# Patient Record
Sex: MALE | Race: WHITE | NOT HISPANIC OR LATINO | Employment: FULL TIME | URBAN - METROPOLITAN AREA
[De-identification: names, ages, dates, MRNs, and addresses within clinical notes are randomized per-mention and may not be internally consistent; named-entity substitution may affect disease eponyms.]

---

## 2019-08-13 ENCOUNTER — OFFICE VISIT (OUTPATIENT)
Dept: URGENT CARE | Facility: CLINIC | Age: 24
End: 2019-08-13
Payer: COMMERCIAL

## 2019-08-13 VITALS
BODY MASS INDEX: 29.16 KG/M2 | RESPIRATION RATE: 18 BRPM | HEART RATE: 88 BPM | WEIGHT: 220 LBS | TEMPERATURE: 96.8 F | DIASTOLIC BLOOD PRESSURE: 78 MMHG | HEIGHT: 73 IN | SYSTOLIC BLOOD PRESSURE: 120 MMHG | OXYGEN SATURATION: 99 %

## 2019-08-13 DIAGNOSIS — B00.1 HERPES LABIALIS WITHOUT COMPLICATION: Primary | ICD-10-CM

## 2019-08-13 PROCEDURE — G0381 LEV 2 HOSP TYPE B ED VISIT: HCPCS | Performed by: PHYSICIAN ASSISTANT

## 2019-08-13 PROCEDURE — S9083 URGENT CARE CENTER GLOBAL: HCPCS | Performed by: PHYSICIAN ASSISTANT

## 2019-08-13 RX ORDER — VALACYCLOVIR HYDROCHLORIDE 1 G/1
TABLET, FILM COATED ORAL
Qty: 4 TABLET | Refills: 0 | Status: SHIPPED | OUTPATIENT
Start: 2019-08-13 | End: 2019-08-14

## 2019-08-13 NOTE — PROGRESS NOTES
[facility]    NAME: Loy Mcrae is a 21 y o  male  : 1995    MRN: [id number]  DATE: 2019  TIME: 1:01 PM    Assessment and Plan   Herpes labialis without complication [H83 3]  1  Herpes labialis without complication  valACYclovir (VALTREX) 1,000 mg tablet       Patient Instructions     Patient Instructions   Take medication as instructed  Transmission precautions advised  Follow up with family doctor as needed  Chief Complaint     Chief Complaint   Patient presents with    Mouth Lesions     Has a cold sore to the right upper lip       History of Present Illness   Loy Mcrae presents to the clinic c/o  55-year-old male comes in with cold sore upper lip  He reports a history of recurrent cold sores  He has tried Abreva, vitamin pills, UV light treatment without a whole lot of affect  He has been given pills in the past and says that they work the best       Review of Systems   Review of Systems   Constitutional: Negative  Skin: Positive for color change and rash  Current Medications     Long-Term Medications   Medication Sig Dispense Refill    valACYclovir (VALTREX) 1,000 mg tablet 2 tablets every 12 hours x 1 day  4 tablet 0       Current Allergies     Allergies as of 2019    (No Known Allergies)          The following portions of the patient's history were reviewed and updated as appropriate: allergies, current medications, past family history, past medical history, past social history, past surgical history and problem list   History reviewed  No pertinent past medical history  History reviewed  No pertinent surgical history  History reviewed  No pertinent family history  Objective   /78   Pulse 88   Temp (!) 96 8 °F (36 °C) (Tympanic)   Resp 18   Ht 6' 1" (1 854 m)   Wt 99 8 kg (220 lb)   SpO2 99%   BMI 29 03 kg/m²   No LMP for male patient         Physical Exam     Physical Exam   Constitutional: He is oriented to person, place, and time  He appears well-developed and well-nourished  No distress  HENT:   Single herpetic lesion upper lip on right side without signs of secondary infection  Vesicular  Approx  2 5 mm  Cardiovascular: Normal rate and regular rhythm  Pulmonary/Chest: Effort normal    Neurological: He is alert and oriented to person, place, and time  Skin: Skin is warm and dry  He is not diaphoretic  Psychiatric: He has a normal mood and affect  Nursing note and vitals reviewed

## 2019-08-13 NOTE — PATIENT INSTRUCTIONS
Take medication as instructed  Transmission precautions advised  Follow up with family doctor as needed

## 2021-01-18 ENCOUNTER — OFFICE VISIT (OUTPATIENT)
Dept: URGENT CARE | Facility: CLINIC | Age: 26
End: 2021-01-18
Payer: COMMERCIAL

## 2021-01-18 VITALS
RESPIRATION RATE: 18 BRPM | HEART RATE: 88 BPM | OXYGEN SATURATION: 98 % | HEIGHT: 73 IN | SYSTOLIC BLOOD PRESSURE: 147 MMHG | BODY MASS INDEX: 30.48 KG/M2 | TEMPERATURE: 98.7 F | DIASTOLIC BLOOD PRESSURE: 70 MMHG | WEIGHT: 230 LBS

## 2021-01-18 DIAGNOSIS — S61.432A PUNCTURE WOUND OF LEFT HAND WITHOUT FOREIGN BODY, INITIAL ENCOUNTER: Primary | ICD-10-CM

## 2021-01-18 PROCEDURE — 99212 OFFICE O/P EST SF 10 MIN: CPT | Performed by: PHYSICIAN ASSISTANT

## 2021-01-18 PROCEDURE — 90471 IMMUNIZATION ADMIN: CPT | Performed by: PHYSICIAN ASSISTANT

## 2021-01-18 PROCEDURE — 90715 TDAP VACCINE 7 YRS/> IM: CPT

## 2021-01-18 RX ORDER — CEPHALEXIN 500 MG/1
500 CAPSULE ORAL EVERY 8 HOURS SCHEDULED
Qty: 21 CAPSULE | Refills: 0 | Status: SHIPPED | OUTPATIENT
Start: 2021-01-18 | End: 2021-01-25

## 2021-01-18 NOTE — PATIENT INSTRUCTIONS
Puncture Wound   AMBULATORY CARE:   A puncture wound  is a hole in the skin made by a sharp, pointed object  The area may be bruised or swollen  You may have bleeding, pain, or trouble moving the affected area  Seek care immediately if:   · You have severe pain  · You have numbness or tingling in the area of your wound  · Your wound starts bleeding and does not stop, even after you apply pressure  Call your doctor if:   · You have new drainage or a bad odor coming from the wound  · You have a fever or chills  · You have increased swelling, redness, or pain  · You have red streaks on your skin coming from your wound  · You have questions or concerns about your condition or care  Medicines: You may need any of the following:  · NSAIDs , such as ibuprofen, help decrease swelling, pain, and fever  This medicine is available with or without a doctor's order  NSAIDs can cause stomach bleeding or kidney problems in certain people  If you take blood thinner medicine, always ask your healthcare provider if NSAIDs are safe for you  Always read the medicine label and follow directions  · Antibiotics  help prevent a bacterial infection  · Take your medicine as directed  Contact your healthcare provider if you think your medicine is not helping or if you have side effects  Tell him of her if you are allergic to any medicine  Keep a list of the medicines, vitamins, and herbs you take  Include the amounts, and when and why you take them  Bring the list or the pill bottles to follow-up visits  Carry your medicine list with you in case of an emergency  Care for your wound as directed:  Keep your wound clean and dry  When you are allowed to bathe, carefully wash the wound with soap and water  Dry the area and put on new, clean bandages as directed  Change your bandages when they get wet or dirty  Rest and elevate  the injured area above the level of your heart as often as you can   This will help decrease swelling and pain  Prop your injured area on pillows or blankets to keep it elevated comfortably  Follow up with your doctor in 2 to 3 days:  Write down your questions so you remember to ask them during your visits  © Copyright [address] Information is for End User's use only and may not be sold, redistributed or otherwise used for commercial purposes  All illustrations and images included in CareNotes® are the copyrighted property of [employer]  or [facility] Blake Santiago   The above information is an  only  It is not intended as medical advice for individual conditions or treatments  Talk to your doctor, nurse or pharmacist before following any medical regimen to see if it is safe and effective for you

## 2021-01-18 NOTE — PROGRESS NOTES
[facility]    NAME: Tequila Eanmorado is a 22 y o  male  : 1995    MRN: [id number]  DATE: 2021  TIME: 12:33 PM    Assessment and Plan   Puncture wound of left hand without foreign body, initial encounter [R97 929A]  1  Puncture wound of left hand without foreign body, initial encounter  TDAP VACCINE GREATER THAN OR EQUAL TO 6YO IM    cephalexin (KEFLEX) 500 mg capsule     Nurse cleansed wound  Provider did trim a little bit of loose tissue off of wound site  No sutures needed  Nurse applied small pressure dressing  Nurse administered Tdap vaccine  Patient Instructions   Patient Instructions     Puncture Wound   AMBULATORY CARE:   A puncture wound  is a hole in the skin made by a sharp, pointed object  The area may be bruised or swollen  You may have bleeding, pain, or trouble moving the affected area  Seek care immediately if:   · You have severe pain  · You have numbness or tingling in the area of your wound  · Your wound starts bleeding and does not stop, even after you apply pressure  Call your doctor if:   · You have new drainage or a bad odor coming from the wound  · You have a fever or chills  · You have increased swelling, redness, or pain  · You have red streaks on your skin coming from your wound  · You have questions or concerns about your condition or care  Medicines: You may need any of the following:  · NSAIDs , such as ibuprofen, help decrease swelling, pain, and fever  This medicine is available with or without a doctor's order  NSAIDs can cause stomach bleeding or kidney problems in certain people  If you take blood thinner medicine, always ask your healthcare provider if NSAIDs are safe for you  Always read the medicine label and follow directions  · Antibiotics  help prevent a bacterial infection  · Take your medicine as directed    Contact your healthcare provider if you think your medicine is not helping or if you have side effects  Tell him of her if you are allergic to any medicine  Keep a list of the medicines, vitamins, and herbs you take  Include the amounts, and when and why you take them  Bring the list or the pill bottles to follow-up visits  Carry your medicine list with you in case of an emergency  Care for your wound as directed:  Keep your wound clean and dry  When you are allowed to bathe, carefully wash the wound with soap and water  Dry the area and put on new, clean bandages as directed  Change your bandages when they get wet or dirty  Rest and elevate  the injured area above the level of your heart as often as you can  This will help decrease swelling and pain  Prop your injured area on pillows or blankets to keep it elevated comfortably  Follow up with your doctor in 2 to 3 days:  Write down your questions so you remember to ask them during your visits  © Copyright [facility] [address] Information is for End User's use only and may not be sold, redistributed or otherwise used for commercial purposes  All illustrations and images included in CareNotes® are the copyrighted property of [employer]  or [address] [place] [address]  The above information is an  only  It is not intended as medical advice for individual conditions or treatments  Talk to your doctor, nurse or pharmacist before following any medical regimen to see if it is safe and effective for you  Chief Complaint     Chief Complaint   Patient presents with    Puncture Wound     Patient drilled into the left hand last night  History of Present Illness   Vicki Merlin presents to the clinic c/o  49-year-old right-hand-dominant male comes in with puncture wound left palm of hand  Was using an electronic drill  While holding wooden box in his left hand, he accidentally screwed through the would and into the palm of his left hand sustaining wound  Able move hand without difficulty  Needs tetanus vaccine        Review of Systems Review of Systems   Constitutional: Negative  Skin: Positive for wound  Neurological: Negative for weakness and numbness  Current Medications     Long-Term Medications   Medication Sig Dispense Refill    valACYclovir (VALTREX) 1,000 mg tablet 2 tablets every 12 hours x 1 day  4 tablet 0       Current Allergies     Allergies as of 01/18/2021    (No Known Allergies)          The following portions of the patient's history were reviewed and updated as appropriate: allergies, current medications, past family history, past medical history, past social history, past surgical history and problem list   No past medical history on file  No past surgical history on file  No family history on file  Objective   /70   Pulse 88   Temp 98 7 °F (37 1 °C) (Tympanic)   Resp 18   Ht 6' 1" (1 854 m)   Wt 104 kg (230 lb)   SpO2 98%   BMI 30 34 kg/m²   No LMP for male patient  Physical Exam     Physical Exam  Vitals signs and nursing note reviewed  Constitutional:       General: He is not in acute distress  Appearance: Normal appearance  He is well-developed  He is not ill-appearing, toxic-appearing or diaphoretic  Cardiovascular:      Rate and Rhythm: Normal rate and regular rhythm  Pulmonary:      Effort: Pulmonary effort is normal    Musculoskeletal:      Comments: Left hand good range of motion fingers without evidence of tendinopathy  No pain with active range of motion fingers, especially left long finger  No pain with resisted range of motion fingers  Skin:     General: Skin is warm and dry  Comments: Palm of left hand with 0 8 cm superficial wound central portion just proximal to left long finger  No evidence of foreign body  Some denuded skin around the edges  Small amount of subcutaneous fat noted  Neurological:      Mental Status: He is alert and oriented to person, place, and time     Psychiatric:         Mood and Affect: Mood normal          Behavior: Behavior normal